# Patient Record
Sex: FEMALE | URBAN - METROPOLITAN AREA
[De-identification: names, ages, dates, MRNs, and addresses within clinical notes are randomized per-mention and may not be internally consistent; named-entity substitution may affect disease eponyms.]

---
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
## 2025-04-18 ENCOUNTER — OFFICE VISIT (OUTPATIENT)
Age: 20
End: 2025-04-18

## 2025-04-18 VITALS
OXYGEN SATURATION: 98 % | DIASTOLIC BLOOD PRESSURE: 76 MMHG | RESPIRATION RATE: 19 BRPM | WEIGHT: 176 LBS | SYSTOLIC BLOOD PRESSURE: 122 MMHG | HEART RATE: 87 BPM | BODY MASS INDEX: 28.28 KG/M2 | TEMPERATURE: 98.2 F | HEIGHT: 66 IN

## 2025-04-18 DIAGNOSIS — R10.84 GENERALIZED ABDOMINAL PAIN: ICD-10-CM

## 2025-04-18 DIAGNOSIS — R35.0 URINARY FREQUENCY: Primary | ICD-10-CM

## 2025-04-18 LAB
BILIRUBIN, POC: NORMAL
BLOOD URINE, POC: NORMAL
CLARITY, POC: NORMAL
COLOR, POC: NORMAL
CONTROL: ABNORMAL
GLUCOSE URINE, POC: NORMAL MG/DL
KETONES, POC: NORMAL MG/DL
LEUKOCYTE EST, POC: NORMAL
NITRITE, POC: NORMAL
PH, POC: 5
PREGNANCY TEST URINE, POC: ABNORMAL
PROTEIN, POC: NORMAL MG/DL
SPECIFIC GRAVITY, POC: 1.01
UROBILINOGEN, POC: NORMAL MG/DL

## 2025-04-18 RX ORDER — IBUPROFEN 800 MG/1
800 TABLET, FILM COATED ORAL 3 TIMES DAILY PRN
Qty: 270 TABLET | Refills: 1 | Status: SHIPPED | OUTPATIENT
Start: 2025-04-18 | End: 2025-04-18

## 2025-04-18 RX ORDER — IBUPROFEN 800 MG/1
800 TABLET, FILM COATED ORAL 3 TIMES DAILY PRN
Qty: 270 TABLET | Refills: 1 | Status: SHIPPED | OUTPATIENT
Start: 2025-04-18

## 2025-04-18 ASSESSMENT — ENCOUNTER SYMPTOMS
COUGH: 0
ABDOMINAL PAIN: 1
CONSTIPATION: 0
SHORTNESS OF BREATH: 0
SORE THROAT: 0
VOMITING: 0
DIARRHEA: 0

## 2025-04-18 NOTE — PROGRESS NOTES
[facility], [facility] ([facility])  [facility] [place] [address]  Dept: [phone]    Date: 25    Maranda Aguilar (:  2005) is a 19 y.o. female, here for evaluation of the following chief complaint(s):  Abdominal Pain      HPI    History provided by:  Patient   used: No    Abdominal Pain  This is a new problem. The current episode started yesterday. The problem occurs intermittently. The problem has been waxing and waning since onset. The pain is located in the generalized abdominal region. The pain is at a severity of 3/10. The pain is mild. The quality of the pain is described as aching. The pain does not radiate. Pertinent negatives include no constipation, diarrhea, fever, headaches, sore throat or vomiting. Nothing relieves the symptoms.        ROS  Review of Systems   Constitutional:  Negative for chills and fever.   HENT:  Negative for congestion, ear pain and sore throat.    Respiratory:  Negative for cough and shortness of breath.    Cardiovascular:  Negative for chest pain.   Gastrointestinal:  Positive for abdominal pain. Negative for constipation, diarrhea and vomiting.   Neurological:  Negative for dizziness and headaches.   All other systems reviewed and are negative.      PHYSICAL EXAM  Vitals:    25 1453   BP: 122/76   Pulse: 87   Resp: 19   Temp: 98.2 °F (36.8 °C)   TempSrc: Oral   SpO2: 98%   Weight: 79.8 kg (176 lb)   Height: 1.676 m (5' 6\")     Physical Exam  Constitutional:       Appearance: Normal appearance.   HENT:      Right Ear: Tympanic membrane normal.      Left Ear: Tympanic membrane normal.      Nose: Nose normal.      Mouth/Throat:      Mouth: Mucous membranes are moist.   Eyes:      Extraocular Movements: Extraocular movements intact.      Pupils: Pupils are equal, round, and reactive to light.   Cardiovascular:      Rate and Rhythm: Normal rate and regular rhythm.   Pulmonary:      Effort:

## 2025-04-24 ENCOUNTER — HOSPITAL ENCOUNTER (EMERGENCY)
Age: 20
Discharge: HOME | End: 2025-04-24
Attending: EMERGENCY MEDICINE
Payer: MEDICAID

## 2025-04-24 ENCOUNTER — APPOINTMENT (OUTPATIENT)
Dept: GENERAL RADIOLOGY | Age: 20
End: 2025-04-24
Payer: MEDICAID

## 2025-04-24 VITALS
HEIGHT: 66 IN | BODY MASS INDEX: 36.96 KG/M2 | DIASTOLIC BLOOD PRESSURE: 79 MMHG | WEIGHT: 230 LBS | HEART RATE: 104 BPM | OXYGEN SATURATION: 90 % | RESPIRATION RATE: 23 BRPM | SYSTOLIC BLOOD PRESSURE: 167 MMHG | TEMPERATURE: 98.5 F

## 2025-04-24 DIAGNOSIS — J45.909 UNCOMPLICATED ASTHMA, UNSPECIFIED ASTHMA SEVERITY, UNSPECIFIED WHETHER PERSISTENT: Primary | ICD-10-CM

## 2025-04-24 LAB
ANION GAP SERPL CALCULATED.3IONS-SCNC: 13 MMOL/L (ref 9–16)
BASOPHILS # BLD: <0.03 K/UL (ref 0–0.2)
BASOPHILS NFR BLD: 0 % (ref 0–2)
BUN SERPL-MCNC: 5 MG/DL (ref 6–20)
CALCIUM SERPL-MCNC: 8.9 MG/DL (ref 8.6–10.4)
CHLORIDE SERPL-SCNC: 106 MMOL/L (ref 98–107)
CO2 SERPL-SCNC: 21 MMOL/L (ref 20–31)
CREAT SERPL-MCNC: 0.6 MG/DL (ref 0.6–0.9)
D DIMER PPP FEU-MCNC: 0.46 UG/ML FEU (ref 0–0.57)
EOSINOPHIL # BLD: 0.81 K/UL (ref 0–0.44)
EOSINOPHILS RELATIVE PERCENT: 9 % (ref 1–4)
ERYTHROCYTE [DISTWIDTH] IN BLOOD BY AUTOMATED COUNT: 13.5 % (ref 11.8–14.4)
FLUAV RNA RESP QL NAA+PROBE: NOT DETECTED
FLUBV RNA RESP QL NAA+PROBE: NOT DETECTED
GFR, ESTIMATED: >90 ML/MIN/1.73M2
GLUCOSE SERPL-MCNC: 160 MG/DL (ref 74–99)
HCG SERPL QL: NEGATIVE
HCT VFR BLD AUTO: 41 % (ref 36.3–47.1)
HGB BLD-MCNC: 13.3 G/DL (ref 11.9–15.1)
IMM GRANULOCYTES # BLD AUTO: 0.03 K/UL (ref 0–0.3)
IMM GRANULOCYTES NFR BLD: 0 %
LYMPHOCYTES NFR BLD: 1.79 K/UL (ref 1.2–5.2)
LYMPHOCYTES RELATIVE PERCENT: 20 % (ref 25–45)
MCH RBC QN AUTO: 25.9 PG (ref 25.2–33.5)
MCHC RBC AUTO-ENTMCNC: 32.4 G/DL (ref 28.4–34.8)
MCV RBC AUTO: 79.9 FL (ref 82.6–102.9)
MONOCYTES NFR BLD: 0.97 K/UL (ref 0.1–1.4)
MONOCYTES NFR BLD: 11 % (ref 2–8)
NEUTROPHILS NFR BLD: 60 % (ref 34–64)
NEUTS SEG NFR BLD: 5.38 K/UL (ref 1.8–8)
NRBC BLD-RTO: 0 PER 100 WBC
PLATELET # BLD AUTO: 291 K/UL (ref 138–453)
PMV BLD AUTO: 10.6 FL (ref 8.1–13.5)
POTASSIUM SERPL-SCNC: 3.3 MMOL/L (ref 3.7–5.3)
RBC # BLD AUTO: 5.13 M/UL (ref 3.95–5.11)
RBC # BLD: ABNORMAL 10*6/UL
SARS-COV-2 RNA RESP QL NAA+PROBE: NOT DETECTED
SODIUM SERPL-SCNC: 140 MMOL/L (ref 136–145)
SOURCE: NORMAL
SPECIMEN DESCRIPTION: NORMAL
TROPONIN I SERPL HS-MCNC: 7 NG/L (ref 0–14)
TROPONIN I SERPL HS-MCNC: <6 NG/L (ref 0–14)
WBC OTHER # BLD: 9 K/UL (ref 4.5–13.5)

## 2025-04-24 PROCEDURE — 80048 BASIC METABOLIC PNL TOTAL CA: CPT

## 2025-04-24 PROCEDURE — 6370000000 HC RX 637 (ALT 250 FOR IP): Performed by: STUDENT IN AN ORGANIZED HEALTH CARE EDUCATION/TRAINING PROGRAM

## 2025-04-24 PROCEDURE — 99284 EMERGENCY DEPT VISIT MOD MDM: CPT

## 2025-04-24 PROCEDURE — 85025 COMPLETE CBC W/AUTO DIFF WBC: CPT

## 2025-04-24 PROCEDURE — 71046 X-RAY EXAM CHEST 2 VIEWS: CPT

## 2025-04-24 PROCEDURE — 85379 FIBRIN DEGRADATION QUANT: CPT

## 2025-04-24 PROCEDURE — 87636 SARSCOV2 & INF A&B AMP PRB: CPT

## 2025-04-24 PROCEDURE — 84484 ASSAY OF TROPONIN QUANT: CPT

## 2025-04-24 PROCEDURE — 93005 ELECTROCARDIOGRAM TRACING: CPT | Performed by: EMERGENCY MEDICINE

## 2025-04-24 PROCEDURE — 94640 AIRWAY INHALATION TREATMENT: CPT

## 2025-04-24 PROCEDURE — 84703 CHORIONIC GONADOTROPIN ASSAY: CPT

## 2025-04-24 RX ORDER — IPRATROPIUM BROMIDE AND ALBUTEROL SULFATE 2.5; .5 MG/3ML; MG/3ML
1 SOLUTION RESPIRATORY (INHALATION)
Status: DISCONTINUED | OUTPATIENT
Start: 2025-04-24 | End: 2025-04-24 | Stop reason: HOSPADM

## 2025-04-24 RX ORDER — ACETAMINOPHEN 500 MG
1000 TABLET ORAL ONCE
Status: COMPLETED | OUTPATIENT
Start: 2025-04-24 | End: 2025-04-24

## 2025-04-24 RX ORDER — ALBUTEROL SULFATE 0.83 MG/ML
2.5 SOLUTION RESPIRATORY (INHALATION)
Status: DISCONTINUED | OUTPATIENT
Start: 2025-04-24 | End: 2025-04-24 | Stop reason: HOSPADM

## 2025-04-24 RX ORDER — PREDNISONE 50 MG/1
50 TABLET ORAL DAILY
Qty: 4 TABLET | Refills: 0 | Status: SHIPPED | OUTPATIENT
Start: 2025-04-24 | End: 2025-04-28

## 2025-04-24 RX ORDER — PREDNISONE 20 MG/1
50 TABLET ORAL ONCE
Status: COMPLETED | OUTPATIENT
Start: 2025-04-24 | End: 2025-04-24

## 2025-04-24 RX ORDER — ALBUTEROL SULFATE 5 MG/ML
15 SOLUTION RESPIRATORY (INHALATION)
Status: DISCONTINUED | OUTPATIENT
Start: 2025-04-24 | End: 2025-04-24 | Stop reason: HOSPADM

## 2025-04-24 RX ORDER — ALBUTEROL SULFATE 90 UG/1
2 INHALANT RESPIRATORY (INHALATION) 4 TIMES DAILY PRN
Qty: 54 G | Refills: 0 | Status: SHIPPED | OUTPATIENT
Start: 2025-04-24

## 2025-04-24 RX ORDER — BUDESONIDE AND FORMOTEROL FUMARATE DIHYDRATE 160; 4.5 UG/1; UG/1
2 AEROSOL RESPIRATORY (INHALATION) 2 TIMES DAILY
Qty: 30.6 G | Refills: 0 | Status: SHIPPED | OUTPATIENT
Start: 2025-04-24

## 2025-04-24 RX ORDER — ACETAMINOPHEN 500 MG
1000 TABLET ORAL EVERY 8 HOURS PRN
Qty: 30 TABLET | Refills: 0 | Status: SHIPPED | OUTPATIENT
Start: 2025-04-24

## 2025-04-24 RX ADMIN — PREDNISONE 50 MG: 20 TABLET ORAL at 01:28

## 2025-04-24 RX ADMIN — ACETAMINOPHEN 1000 MG: 500 TABLET ORAL at 01:28

## 2025-04-24 RX ADMIN — POTASSIUM BICARBONATE 40 MEQ: 782 TABLET, EFFERVESCENT ORAL at 02:38

## 2025-04-24 RX ADMIN — IPRATROPIUM BROMIDE AND ALBUTEROL SULFATE 1 DOSE: .5; 2.5 SOLUTION RESPIRATORY (INHALATION) at 02:32

## 2025-04-24 ASSESSMENT — PAIN SCALES - GENERAL: PAINLEVEL_OUTOF10: 7

## 2025-04-24 ASSESSMENT — PAIN - FUNCTIONAL ASSESSMENT: PAIN_FUNCTIONAL_ASSESSMENT: 0-10

## 2025-04-24 NOTE — ED NOTES
Patient to ED via triage with complaints of chest pain, cough, and sob since yesterday. Patient states the pain is in the center of her chest and does not radiate. Patient states she has hx of bronchitis. Patient is tachycardic and tachypnic upon arrival. Patient is A&O x4 and RR even. Patient connected to full cardiac monitor. Call light within reach.

## 2025-04-24 NOTE — DISCHARGE INSTRUCTIONS
You were seen in the emergency department today due to concern for shortness of breath and chest pain.  Your lab workup was unremarkable.  Your chest x-ray was clear.  Your symptoms improved after being treated with breathing treatments and steroids.  You will be prescribed prednisone, albuterol, Symbicort to take.  Please take them as prescribed.     Your potassium level was also slightly low.  This was repleted for you in the emergency department.  Your PCP should have this rechecked for you within the next 48 hours.     Monitor symptoms closely at home.  If you develop any fevers, chills, worsening chest pain, recurrent chest pain, worsening difficulty breathing, nausea vomiting, or any other urgent health concerns, return to the emergency room immediately for reassessment.

## 2025-04-24 NOTE — ED PROVIDER NOTES
[facility] EMERGENCY DEPARTMENT  Emergency Department Encounter  Emergency Medicine Resident     Pt Name:Maranda Aguilar  MRN: [id number]  Birthdate 2005  Date of evaluation: 4/24/25  PCP:  No primary care provider on file.  Note Started: 1:26 AM EDT      CHIEF COMPLAINT       Chief Complaint   Patient presents with    Chest Pain    Shortness of Breath    Cough       HISTORY OF PRESENT ILLNESS  (Location/Symptom, Timing/Onset, Context/Setting, Quality, Duration, Modifying Factors, Severity.)      Maranda Aguilar is a 19 y.o. female with no significant past medical history presenting for assessment of chest pain and shortness of breath.  Onset of symptoms approximately 24 hours ago.  Patient reports a gradual worsening of her symptoms.  Has not taken any medication.  Denies any overt fevers, chills, rhinorrhea, nausea, vomiting, diarrhea, abdominal pain lower extremity swelling.  She does report that she has had an intermittent nonproductive cough.  Reports a remote history of difficulty breathing requiring inhaler treatments.  She reports that she was taking oral contraceptive pills about a month ago but has not taken any since.  States that she uses marijuana products.  Does not use tobacco products.  No alcohol or drug use today.    PAST MEDICAL / SURGICAL / SOCIAL / FAMILY HISTORY      has no past medical history on file.       has no past surgical history on file.      Social History     Socioeconomic History    Marital status: Single     Spouse name: Not on file    Number of children: Not on file    Years of education: Not on file    Highest education level: Not on file   Occupational History    Not on file   Tobacco Use    Smoking status: Never    Smokeless tobacco: Never   Substance and Sexual Activity    Alcohol use: Not on file    Drug use: Not on file    Sexual activity: Not on file   Other Topics Concern    Not on file   Social History Narrative    Not on file     Social Drivers of 
comparison.            Elvi Hartmann DO,  FACEP  Attending Emergency Physician            Elvi Hartmann DO  04/24/25 0252

## 2025-04-24 NOTE — RT PROTOCOL NOTE
RT Inhaler-Nebulizer Bronchodilator Protocol Note    There is a bronchodilator order in the chart from a provider indicating to follow the RT Bronchodilator Protocol and there is an “Initiate RT Inhaler-Nebulizer Bronchodilator Protocol” order as well (see protocol at bottom of note).    CXR Findings:  No results found.    The findings from the last RT Protocol Assessment were as follows:   History Pulmonary Disease: None or smoker <15 pack years  Respiratory Pattern: Dyspnea on exertion or RR 21-25 bpm  Breath Sounds: Inspiratory and expiratory or bilateral wheezing and/or rhonchi  Cough: Strong, productive  Indication for Bronchodilator Therapy: Wheezing associated with pulm disorder  Bronchodilator Assessment Score: 9    Aerosolized bronchodilator medication orders have been revised according to the RT Inhaler-Nebulizer Bronchodilator Protocol below.    Respiratory Therapist to perform RT Therapy Protocol Assessment initially then follow the protocol.  Repeat RT Therapy Protocol Assessment PRN for score 0-3 or on second treatment, BID, and PRN for scores above 3.    No Indications - adjust the frequency to every 6 hours PRN wheezing or bronchospasm, if no treatments needed after 48 hours then discontinue using Per Protocol order mode.     If indication present, adjust the RT bronchodilator orders based on the Bronchodilator Assessment Score as indicated below.  Use Inhaler orders unless patient has one or more of the following: on home nebulizer, not able to hold breath for 10 seconds, is not alert and oriented, cannot activate and use MDI correctly, or respiratory rate 25 breaths per minute or more, then use the equivalent nebulizer order(s) with same Frequency and PRN reasons based on the score.  If a patient is on this medication at home then do not decrease Frequency below that used at home.    0-3 - enter or revise RT bronchodilator order(s) to equivalent RT Bronchodilator order with Frequency of every 4

## 2025-04-25 LAB
EKG ATRIAL RATE: 107 BPM
EKG P AXIS: 43 DEGREES
EKG P-R INTERVAL: 174 MS
EKG Q-T INTERVAL: 334 MS
EKG QRS DURATION: 80 MS
EKG QTC CALCULATION (BAZETT): 445 MS
EKG R AXIS: 59 DEGREES
EKG T AXIS: 22 DEGREES
EKG VENTRICULAR RATE: 107 BPM

## 2025-05-08 ENCOUNTER — HOSPITAL ENCOUNTER (EMERGENCY)
Age: 20
Discharge: HOME | End: 2025-05-08
Attending: EMERGENCY MEDICINE
Payer: MEDICAID

## 2025-05-08 ENCOUNTER — APPOINTMENT (OUTPATIENT)
Dept: GENERAL RADIOLOGY | Age: 20
End: 2025-05-08
Payer: MEDICAID

## 2025-05-08 VITALS
SYSTOLIC BLOOD PRESSURE: 145 MMHG | RESPIRATION RATE: 16 BRPM | OXYGEN SATURATION: 98 % | HEART RATE: 69 BPM | BODY MASS INDEX: 38.43 KG/M2 | DIASTOLIC BLOOD PRESSURE: 107 MMHG | TEMPERATURE: 98.2 F | WEIGHT: 238.1 LBS

## 2025-05-08 DIAGNOSIS — J01.90 ACUTE NON-RECURRENT SINUSITIS, UNSPECIFIED LOCATION: Primary | ICD-10-CM

## 2025-05-08 PROCEDURE — 6360000002 HC RX W HCPCS

## 2025-05-08 PROCEDURE — 99284 EMERGENCY DEPT VISIT MOD MDM: CPT | Performed by: EMERGENCY MEDICINE

## 2025-05-08 PROCEDURE — 96372 THER/PROPH/DIAG INJ SC/IM: CPT | Performed by: EMERGENCY MEDICINE

## 2025-05-08 PROCEDURE — 71045 X-RAY EXAM CHEST 1 VIEW: CPT

## 2025-05-08 PROCEDURE — 6370000000 HC RX 637 (ALT 250 FOR IP)

## 2025-05-08 RX ORDER — PROCHLORPERAZINE EDISYLATE 5 MG/ML
10 INJECTION INTRAMUSCULAR; INTRAVENOUS ONCE
Status: COMPLETED | OUTPATIENT
Start: 2025-05-08 | End: 2025-05-08

## 2025-05-08 RX ORDER — DIPHENHYDRAMINE HYDROCHLORIDE 50 MG/ML
25 INJECTION, SOLUTION INTRAMUSCULAR; INTRAVENOUS ONCE
Status: COMPLETED | OUTPATIENT
Start: 2025-05-08 | End: 2025-05-08

## 2025-05-08 RX ORDER — ACETAMINOPHEN 500 MG
1000 TABLET ORAL ONCE
Status: COMPLETED | OUTPATIENT
Start: 2025-05-08 | End: 2025-05-08

## 2025-05-08 RX ADMIN — PROCHLORPERAZINE EDISYLATE 10 MG: 5 INJECTION INTRAMUSCULAR; INTRAVENOUS at 08:26

## 2025-05-08 RX ADMIN — AMOXICILLIN AND CLAVULANATE POTASSIUM 1 TABLET: 875; 125 TABLET, FILM COATED ORAL at 09:28

## 2025-05-08 RX ADMIN — DIPHENHYDRAMINE HYDROCHLORIDE 25 MG: 50 INJECTION INTRAMUSCULAR; INTRAVENOUS at 08:23

## 2025-05-08 RX ADMIN — ACETAMINOPHEN 1000 MG: 500 TABLET ORAL at 08:23

## 2025-05-08 ASSESSMENT — ENCOUNTER SYMPTOMS
COUGH: 1
NAUSEA: 0
EYE DISCHARGE: 1
VOMITING: 0
SHORTNESS OF BREATH: 0

## 2025-05-08 ASSESSMENT — PAIN SCALES - GENERAL: PAINLEVEL_OUTOF10: 7

## 2025-05-08 NOTE — DISCHARGE INSTRUCTIONS
Call today or tomorrow to follow up with your PCP in 1-2 days.    Take your medication as indicated, if you are given an antibiotic then make sure you get the prescription filled and take the antibiotics until finished.  Drink plenty of water while taking the antibiotics.  Avoid drinking alcohol while taking antibiotics.     Return to the Emergency Department for worsening of cough, fever > 101.5 and not controlled with Tylenol or Ibuprofen, excessive nausea or vomiting, worsening of nasal discharge, any other care or concern.

## 2025-05-08 NOTE — ED PROVIDER NOTES
[facility] EMERGENCY DEPARTMENT  Emergency Department Encounter  Emergency Medicine Resident     Pt Name:Maranda Aguilar  MRN: [id number]  Birthdate 2005  Date of evaluation: 5/8/25  PCP:  No primary care provider on file.  Note Started: 8:05 AM EDT      CHIEF COMPLAINT       Chief Complaint   Patient presents with    Eye Drainage     Rt eye    Headache       HISTORY OF PRESENT ILLNESS  (Location/Symptom, Timing/Onset, Context/Setting, Quality, Duration, Modifying Factors, Severity.)      Maranda Aguilar is a 20 y.o. female who presents with headache, cough congestion and sinus pressure.  Patient states that she has also had yellowish thick discharge from the right eye.  Patient denies any redness but states that she has had the discharge coming out of her eye for the past few days.  Patient states that her headache is a 7-1/2 of 10, states that she does not get normal migraines but did get a migraine like this when she was a kid.  Denies any associated blurry vision nausea vomiting numbness tingling or weakness to any of her extremity.  Denies any falls or injuries.  Patient states that her sinus pressure and cough has been present for 2 weeks.  Around 2 weeks ago she did come to the emergency department with concern for asthma exacerbation.  Has been taking Tylenol and Motrin with little relief in her symptoms.      PAST MEDICAL / SURGICAL / SOCIAL / FAMILY HISTORY      has no past medical history on file.       has no past surgical history on file.      Social History     Socioeconomic History    Marital status: Single     Spouse name: Not on file    Number of children: Not on file    Years of education: Not on file    Highest education level: Not on file   Occupational History    Not on file   Tobacco Use    Smoking status: Never    Smokeless tobacco: Never   Substance and Sexual Activity    Alcohol use: Yes     Comment: social    Drug use: Yes     Types: Marijuana (Weed)    Sexual activity:

## 2025-05-08 NOTE — ED TRIAGE NOTES
Pt arrived to ED 29 via triage.   Pt co Lt eye drainage and headache.   Pt states that the eye drainage started yesterday.   Pt states that the headache started 4 days ago.   Pt denies any pain relief with over the counter medication.   Pt is resting on stretcher with call light within reach.  Breathing is non labored and no acute distress is noted.   Will continue to follow plan of care

## 2025-05-08 NOTE — ED PROVIDER NOTES
[facility]  Emergency Department  Faculty Attestation     I performed a history and physical examination of the patient and discussed management with the resident. I reviewed the resident’s note and agree with the documented findings and plan of care. Any areas of disagreement are noted on the chart. I was personally present for the key portions of any procedures. I have documented in the chart those procedures where I was not present during the key portions. I have reviewed the emergency nurses triage note. I agree with the chief complaint, past medical history, past surgical history, allergies, medications, social and family history as documented unless otherwise noted below.    For Physician Assistant/ Nurse Practitioner cases/documentation I have personally evaluated this patient and have completed at least one if not all key elements of the E/M (history, physical exam, and MDM). Additional findings are as noted.    Preliminary note started at 8:26 AM EDT    Primary Care Physician:  No primary care provider on file.    Screenings:  [unfilled]    CHIEF COMPLAINT       Chief Complaint   Patient presents with    Eye Drainage     Rt eye    Headache       RECENT VITALS:   BP (!) 145/107   Pulse 69   Temp 98.2 °F (36.8 °C) (Oral)   Resp 16   Wt 108 kg (238 lb 1.6 oz)   LMP 04/21/2025   SpO2 98%   BMI 38.43 kg/m²     LABS:  Labs Reviewed - No data to display    Radiology  XR CHEST PORTABLE    (Results Pending)         Attending Physician Additional  Notes    Patient has headache in the facial region with nasal congestion cough and sputum production.  She had a viral URI 2 weeks ago.  She has had 2 weeks of the symptoms.  She now has nausea and photophobia.  No history of diagnosed migraines.  No strokelike symptoms.  No stiff neck.  On exam she is uncomfortable, minimally hypertensive, afebrile.  There is no nasal edema.  There is bilateral maxillary tenderness.  No